# Patient Record
Sex: MALE
[De-identification: names, ages, dates, MRNs, and addresses within clinical notes are randomized per-mention and may not be internally consistent; named-entity substitution may affect disease eponyms.]

---

## 2020-11-12 ENCOUNTER — HOSPITAL ENCOUNTER (OUTPATIENT)
Dept: HOSPITAL 88 - MRI | Age: 30
End: 2020-11-12
Attending: FAMILY MEDICINE
Payer: COMMERCIAL

## 2020-11-12 DIAGNOSIS — S83.91XA: Primary | ICD-10-CM

## 2020-11-12 NOTE — DIAGNOSTIC IMAGING REPORT
TECHNIQUE:

Magnetic resonance imaging of the RIGHT KNEE was performed WITHOUT injected

contrast. 



HISTORY:  Right knee pain, right knee sprain

COMPARISON:  None available.



FINDINGS: 

LIGAMENTS AND TENDONS:

     ACL:  Increased signal with questionable fiber irregularity near the

femoral attachment, may represent sprain versus low-grade partial tear.

     PCL:  Intact

     Collateral ligaments:  Intact

     Iliotibial band:  Unremarkable

     Popliteal tendon:  Intact

     Extensor mechanism:  Intact



Mild increased signal along the semimembranosus myotendinous junction. Distal

tendon remains intact.



JOINT:

     Menisci: 

          Medial:  Bucket-handle tear of the medial meniscus with large

fragment flipped into the intercondylar notch.

          Lateral:  Intact



     Articular Cartilage:

          Medial Compartment:  No focal defect.

          Lateral Compartment:  No focal defect.

          Patellofemoral Compartment:  No focal defect.



     Joint Fluid: Moderate joint effusion.



BONE:  

No focal or infiltrative bone marrow replacing abnormality.  

No acute fracture.



SOFT TISSUES:

Mild diffuse subcutaneous edema.





IMPRESSION: 

1.  Bucket-handle tear of the medial meniscus with large fragment flipped into

the intercondylar notch.

2.  Increased signal in the ACL with questionable fiber irregularity near the

femoral attachment, may represent ACL sprain versus low-grade partial tear.

3. Low-grade strain at the semitendinosus myotendinous junction.

4. Moderate joint effusion.



Signed by: Dr. David Conn M.D. on 11/12/2020 10:43 AM